# Patient Record
Sex: FEMALE
[De-identification: names, ages, dates, MRNs, and addresses within clinical notes are randomized per-mention and may not be internally consistent; named-entity substitution may affect disease eponyms.]

---

## 2018-03-14 ENCOUNTER — HOSPITAL ENCOUNTER (OUTPATIENT)
Dept: HOSPITAL 92 - NM | Age: 28
Discharge: HOME | End: 2018-03-14
Attending: FAMILY MEDICINE
Payer: COMMERCIAL

## 2018-03-14 DIAGNOSIS — E05.90: Primary | ICD-10-CM

## 2018-03-14 PROCEDURE — 76536 US EXAM OF HEAD AND NECK: CPT

## 2018-03-14 NOTE — ULT
THYROID ULTRASOUND:

 

History: Goiter. 

 

Comparison: None. 

 

Technique: Sagittal and transverse imaging of the thyroid performed. 

 

FINDINGS: 

Thyroid isthmus is 0.8 cm. Right thyroid lobe is 4.4 x 2.7 x 2.9 cm. Left thyroid lobe is 4.3 x 2.0 x
 2.2 cm. 

 

There is diffuse heterogeneity throughout the thyroid gland without discrete mass or nodule. There is
 increased vascularity. 

 

IMPRESSION: 

Increased heterogeneity and increased vascularity of the thyroid gland. No discrete nodules. 

 

POS: [facility]

## 2018-03-26 ENCOUNTER — HOSPITAL ENCOUNTER (OUTPATIENT)
Dept: HOSPITAL 92 - NM | Age: 28
Discharge: HOME | End: 2018-03-26
Attending: FAMILY MEDICINE
Payer: COMMERCIAL

## 2018-03-26 DIAGNOSIS — E05.90: Primary | ICD-10-CM

## 2018-03-26 PROCEDURE — 78014 THYROID IMAGING W/BLOOD FLOW: CPT

## 2018-03-26 PROCEDURE — A9516 IODINE I-123 SOD IODIDE MIC: HCPCS

## 2018-03-27 NOTE — NM
RADIOIODINE THYROID UPTAKE AND SCAN:

 

HISTORY: 

A 27-year-old with newly diagnosed hyperthyroidism.

 

RADIOPHARMACEUTICAL:

260 uCi Iodine-123 administered orally.

 

FINDINGS: 

Planar anterior and anterior oblique images of the thyroid gland were obtained.

 

There is homogeneous tracer distribution to both lobes of the thyroid gland without focal cold or hot
 nodules.  The right lobe measures 6.1 cm and the left lobe measures 6.7 cm.  

 

The 24-hour uptake measures 76.5% (normal 10-30%).

 

IMPRESSION: 

Findings are consistent with hyperthyroid Graves' disease.

 

POS: C